# Patient Record
(demographics unavailable — no encounter records)

---

## 2024-12-03 NOTE — CARDIOLOGY SUMMARY
[de-identified] : January 17, 2023 atrial fibrillation with slow ventricular response abnormal ST-T appearance 5.16.23 af moderate rate, nstwc March 19, 2024 AF moderate rate abnormal ST appearance unchanged July 23, 2024 AF abnormal ST-T changes similar to prior [de-identified] : 2023  af variable rates wct [de-identified] : oct 14, 2021 normal nuclear stress [de-identified] : January 2022 mild  AS normal LV systolic function March 2024 moderate AS normal LV systolic function atrial enlargement [de-identified] : 2010; 2016 multivessel [de-identified] : cabg 2016

## 2024-12-03 NOTE — REVIEW OF SYSTEMS
[Feeling Fatigued] : not feeling fatigued [Weight Loss (___ Lbs)] : [unfilled] ~Ulb weight loss [Cough] : cough [Negative] : Cardiovascular

## 2024-12-03 NOTE — HISTORY OF PRESENT ILLNESS
[FreeTextEntry1] : History of ASHD CABG atrial fibrillation MAIK polycythemia.  No angina usual degree of dyspnea no edema or palpitations.    Wife  recently.  Remains active, goes to gym.  Has some faitigue, no dizziness.

## 2024-12-03 NOTE — REASON FOR VISIT
[Arrhythmia/ECG Abnorrmalities] : arrhythmia/ECG abnormalities [Structural Heart and Valve Disease] : structural heart and valve disease [Hypertension] : hypertension [Coronary Artery Disease] : coronary artery disease [FreeTextEntry3] : Toni

## 2024-12-03 NOTE — ASSESSMENT
[FreeTextEntry1] : 83year-old man  status post CABG multivessel disease.    BP well controlled.    Chronic atrial fibrillation.     Mitral regurgitation as well as aortic valve disease, moderate AS by last echo  MAIK on cpap  Fatigue.

## 2024-12-03 NOTE — DISCUSSION/SUMMARY
[Patient] : the patient [Risks] : risks [Benefits] : benefits [Alternatives] : alternatives [___ Month(s)] : in [unfilled] month(s) [FreeTextEntry1] : Discussed with patient.    Continue current cardiac medication  including Eliquis amlodipine atorvastatin aspirin losartan metoprolol oral a/c.    Follow-up in 4 months.    Labs reviewed and discussed with him.  Discussed his aortic valve disease and natural history  Cardinal symptoms of aortic stenosis reviewed and discussed, and discussed possible future evaluation including cardiac catheterization and possible TAVR or SAVR.  Cardiac monitor hook up today, call for results in 2-3 weeks.  Obtain labs from PMD.  Patient's questions were addressed to their satisfaction.

## 2024-12-03 NOTE — PHYSICAL EXAM
[Well Developed] : well developed [Well Nourished] : well nourished [No Acute Distress] : no acute distress [Normal Conjunctiva] : normal conjunctiva [Normal Venous Pressure] : normal venous pressure [No Carotid Bruit] : no carotid bruit [No Rub] : no rub [No Gallop] : no gallop [Clear Lung Fields] : clear lung fields [Good Air Entry] : good air entry [No Respiratory Distress] : no respiratory distress  [Soft] : abdomen soft [Non Tender] : non-tender [No Masses/organomegaly] : no masses/organomegaly [Normal Bowel Sounds] : normal bowel sounds [Normal Gait] : normal gait [No Cyanosis] : no cyanosis [No Clubbing] : no clubbing [No Varicosities] : no varicosities [Edema ___] : edema [unfilled] [No Rash] : no rash [No Skin Lesions] : no skin lesions [Moves all extremities] : moves all extremities [No Focal Deficits] : no focal deficits [Normal Speech] : normal speech [Alert and Oriented] : alert and oriented [Normal memory] : normal memory [de-identified] : Grade 4/6 systolic murmur best right upper sternal border [de-identified] : irregular

## 2025-04-22 NOTE — CARDIOLOGY SUMMARY
[de-identified] : January 17, 2023 atrial fibrillation with slow ventricular response abnormal ST-T appearance 5.16.23 af moderate rate, nstwc March 19, 2024 AF moderate rate abnormal ST appearance unchanged April 2025 A-fib moderate rate  July 23, 2024 AF abnormal ST-T changes similar to prior [de-identified] : 2023  af variable rates wct [de-identified] : oct 14, 2021 normal nuclear stress [de-identified] : January 2022 mild  AS normal LV systolic function March 2024 moderate AS normal LV systolic function atrial enlargement April 2025 LVH diastolic dysfunction aortic stenosis [de-identified] : 2010; 2016 multivessel [de-identified] : cabg 2016

## 2025-04-22 NOTE — HISTORY OF PRESENT ILLNESS
[FreeTextEntry1] : History of ASHD CABG atrial fibrillation MAIK polycythemia.  No angina no edema or palpitations.  No more fatigue and shortness of breath when climbing stairs or going for long walks.  Has some faitigue, no dizziness.

## 2025-04-22 NOTE — REASON FOR VISIT
[Symptom and Test Evaluation] : symptom and test evaluation [Arrhythmia/ECG Abnorrmalities] : arrhythmia/ECG abnormalities [Structural Heart and Valve Disease] : structural heart and valve disease [Hypertension] : hypertension [Coronary Artery Disease] : coronary artery disease [FreeTextEntry3] : Toni

## 2025-04-22 NOTE — PHYSICAL EXAM
[Well Developed] : well developed [Well Nourished] : well nourished [No Acute Distress] : no acute distress [Normal Conjunctiva] : normal conjunctiva [Normal Venous Pressure] : normal venous pressure [No Carotid Bruit] : no carotid bruit [No Rub] : no rub [No Gallop] : no gallop [Clear Lung Fields] : clear lung fields [Good Air Entry] : good air entry [No Respiratory Distress] : no respiratory distress  [Soft] : abdomen soft [Non Tender] : non-tender [No Masses/organomegaly] : no masses/organomegaly [Normal Bowel Sounds] : normal bowel sounds [Normal Gait] : normal gait [No Cyanosis] : no cyanosis [No Clubbing] : no clubbing [No Varicosities] : no varicosities [Edema ___] : edema [unfilled] [No Rash] : no rash [No Skin Lesions] : no skin lesions [Moves all extremities] : moves all extremities [No Focal Deficits] : no focal deficits [Normal Speech] : normal speech [Alert and Oriented] : alert and oriented [Normal memory] : normal memory [de-identified] : Grade 4/6 systolic murmur best right upper sternal border [de-identified] : irregular

## 2025-04-22 NOTE — ASSESSMENT
[FreeTextEntry1] : 83year-old man  status post CABG multivessel disease.  Diastolic dysfunction, LVH.  Aortic stenosis.   BP well controlled.    Chronic atrial fibrillation.       MAIK on cpap

## 2025-04-22 NOTE — DISCUSSION/SUMMARY
[Patient] : the patient [Risks] : risks [Benefits] : benefits [Alternatives] : alternatives [___ Month(s)] : in [unfilled] month(s) [FreeTextEntry1] : Discussed with patient.  Discussed aortic stenosis, have referred to Dr. Martinez to evaluate for possible TAVR   Continue current cardiac medication  including Eliquis amlodipine atorvastatin aspirin losartan metoprolol oral a/c.    Follow-up in 4 months.    Labs reviewed and discussed with him.  Discussed his aortic valve disease and natural history  Patient's questions were addressed to their satisfaction.      [EKG obtained to assist in diagnosis and management of assessed problem(s)] : EKG obtained to assist in diagnosis and management of assessed problem(s)

## 2025-04-30 NOTE — HISTORY OF PRESENT ILLNESS
[FreeTextEntry1] : Mr. Jacobo is an 83 year old male who is being referred to our clinic by Dr. Barnhart for evaluation of his Aortic Stenosis. He has a past medical history of MAIK, Hypertension, Hyperlipidemia, Chronic AFib, CABG in 2016 with Dr. Kearns, and Thrombocytopenia. He is a former smoker and is a social user of ETOH.  Today he presents  He had a Transthoracic Echocardiogram which was evaluated with Dr. Natty Mccormack, and demonstrates    NYHA Classification: STS 30 day Mortality Risk Score: 4.06% [Chronic Atrial Fibrillation] : chronic atrial fibrillation [Asymptomatic] : Associated Symptoms: the patient is currently asymptomatic

## 2025-04-30 NOTE — CARDIOLOGY SUMMARY
[de-identified] : 4/22/25: AFib QRS 86, QTc 406 msec [de-identified] : 4/17/25: CONCLUSIONS:  1. Technically difficult image quality. 2. Left ventricular cavity is normal in size. Left ventricular wall thickness is mildly increased. Left ventricular systolic function is normal with an ejection fraction of 57 % by Cheney's method of disks. 3. There is moderate (grade 2) left ventricular diastolic dysfunction. 4. Moderate left ventricular hypertrophy. 5. There is increased LV mass and concentric hypertrophy. 6. Left atrium is severely dilated. 7. Structurally normal mitral valve with normal leaflet excursion. 8. There is severe posterior calcification of the mitral valve annulus. 9. Moderate mitral regurgitation. 10. The right ventricle is not well visualized. probably normal right ventricular systolic function. Tricuspid annular plane systolic excursion (TAPSE) is 1.7 cm (normal >=1.7 cm). Tricuspid annular tissue Doppler S' is 0.1 cm/s (normal >10 cm/s). 11. The right atrium is severely dilated. 12. Structurally normal tricuspid valve with normal leaflet excursion. Mild to moderate tricuspid regurgitation. 13. Estimated pulmonary artery systolic pressure is 43 mmHg, consistent with mild pulmonary hypertension. 14. Pulmonic valve was not well visualized. 15. Aortic root at the sinuses of Valsalva is normal in size, measuring 3.50 cm (indexed 1.54 cm/m). Ascending aorta is normal in size, measuring 3.90 cm (indexed 1.71 cm/m). Aortic arch diameter is normal in size, measuring 2.6 cm (indexed 1.12 cm/m). 16. Trileaflet aortic valve with reduced systolic excursion. There is calcification of the aortic valve leaflets. Moderate to severe aortic stenosis. 17. Trace aortic regurgitation. 18. The inferior vena cava is normal in size measuring 0.99 cm in diameter, (normal <2.1cm) with normal inspiratory collapse (normal >50%) consistent with normal right atrial pressure ( R 3, range 0-5mmHg). 19. The interatrial septum appears intact. 20. No pericardial effusion seen. 21. The peak transaortic velocity is 2.98 m/s, peak transaortic gradient is 35.6 mmHg and mean transaortic gradient is 23.1 mmHg with an LVOT/aortic valve VTI ratio of 0.22. The effective orifice area is estimated at 0.84 cm by the continuity equation and indexed at 0.37 cm/m.

## 2025-04-30 NOTE — REASON FOR VISIT
[Structural Heart and Valve Disease] : structural heart and valve disease [FreeTextEntry3] : Dr. Barnhart

## 2025-05-06 NOTE — CARDIOLOGY SUMMARY
[de-identified] : 4/22/25: AFib QRS 86, QTc 406 msec [de-identified] : 4/17/25: CONCLUSIONS:  1. Technically difficult image quality. 2. Left ventricular cavity is normal in size. Left ventricular wall thickness is mildly increased. Left ventricular systolic function is normal with an ejection fraction of 57 % by Cheney's method of disks. 3. There is moderate (grade 2) left ventricular diastolic dysfunction. 4. Moderate left ventricular hypertrophy. 5. There is increased LV mass and concentric hypertrophy. 6. Left atrium is severely dilated. 7. Structurally normal mitral valve with normal leaflet excursion. 8. There is severe posterior calcification of the mitral valve annulus. 9. Moderate mitral regurgitation. 10. The right ventricle is not well visualized. probably normal right ventricular systolic function. Tricuspid annular plane systolic excursion (TAPSE) is 1.7 cm (normal >=1.7 cm). Tricuspid annular tissue Doppler S' is 0.1 cm/s (normal >10 cm/s). 11. The right atrium is severely dilated. 12. Structurally normal tricuspid valve with normal leaflet excursion. Mild to moderate tricuspid regurgitation. 13. Estimated pulmonary artery systolic pressure is 43 mmHg, consistent with mild pulmonary hypertension. 14. Pulmonic valve was not well visualized. 15. Aortic root at the sinuses of Valsalva is normal in size, measuring 3.50 cm (indexed 1.54 cm/m). Ascending aorta is normal in size, measuring 3.90 cm (indexed 1.71 cm/m). Aortic arch diameter is normal in size, measuring 2.6 cm (indexed 1.12 cm/m). 16. Trileaflet aortic valve with reduced systolic excursion. There is calcification of the aortic valve leaflets. Moderate to severe aortic stenosis. 17. Trace aortic regurgitation. 18. The inferior vena cava is normal in size measuring 0.99 cm in diameter, (normal <2.1cm) with normal inspiratory collapse (normal >50%) consistent with normal right atrial pressure ( R 3, range 0-5mmHg). 19. The interatrial septum appears intact. 20. No pericardial effusion seen. 21. The peak transaortic velocity is 2.98 m/s, peak transaortic gradient is 35.6 mmHg and mean transaortic gradient is 23.1 mmHg with an LVOT/aortic valve VTI ratio of 0.22. The effective orifice area is estimated at 0.84 cm by the continuity equation and indexed at 0.37 cm/m.

## 2025-05-06 NOTE — REVIEW OF SYSTEMS
[Headache] : no headache [Chills] : no chills [Chest Discomfort] : no chest discomfort [Palpitations] : no palpitations [Abdominal Pain] : no abdominal pain [Nausea] : no nausea [Vomiting] : no vomiting [Heartburn] : no heartburn [Diarrhea] : diarrhea [Constipation] : no constipation [Blood in stool] : no blood in stoo

## 2025-05-06 NOTE — REVIEW OF SYSTEMS
[Feeling Fatigued] : feeling fatigued [Dyspnea on exertion] : dyspnea during exertion [Lower Ext Edema] : lower extremity edema [Negative] : Heme/Lymph [Headache] : no headache [Chills] : no chills [Chest Discomfort] : no chest discomfort [Palpitations] : no palpitations [Abdominal Pain] : no abdominal pain [Nausea] : no nausea [Vomiting] : no vomiting [Heartburn] : no heartburn [Diarrhea] : diarrhea [Constipation] : no constipation [Blood in stool] : no blood in stoo

## 2025-05-06 NOTE — CARDIOLOGY SUMMARY
[de-identified] : 4/22/25: AFib QRS 86, QTc 406 msec [de-identified] : 4/17/25: CONCLUSIONS:  1. Technically difficult image quality. 2. Left ventricular cavity is normal in size. Left ventricular wall thickness is mildly increased. Left ventricular systolic function is normal with an ejection fraction of 57 % by Cheney's method of disks. 3. There is moderate (grade 2) left ventricular diastolic dysfunction. 4. Moderate left ventricular hypertrophy. 5. There is increased LV mass and concentric hypertrophy. 6. Left atrium is severely dilated. 7. Structurally normal mitral valve with normal leaflet excursion. 8. There is severe posterior calcification of the mitral valve annulus. 9. Moderate mitral regurgitation. 10. The right ventricle is not well visualized. probably normal right ventricular systolic function. Tricuspid annular plane systolic excursion (TAPSE) is 1.7 cm (normal >=1.7 cm). Tricuspid annular tissue Doppler S' is 0.1 cm/s (normal >10 cm/s). 11. The right atrium is severely dilated. 12. Structurally normal tricuspid valve with normal leaflet excursion. Mild to moderate tricuspid regurgitation. 13. Estimated pulmonary artery systolic pressure is 43 mmHg, consistent with mild pulmonary hypertension. 14. Pulmonic valve was not well visualized. 15. Aortic root at the sinuses of Valsalva is normal in size, measuring 3.50 cm (indexed 1.54 cm/m). Ascending aorta is normal in size, measuring 3.90 cm (indexed 1.71 cm/m). Aortic arch diameter is normal in size, measuring 2.6 cm (indexed 1.12 cm/m). 16. Trileaflet aortic valve with reduced systolic excursion. There is calcification of the aortic valve leaflets. Moderate to severe aortic stenosis. 17. Trace aortic regurgitation. 18. The inferior vena cava is normal in size measuring 0.99 cm in diameter, (normal <2.1cm) with normal inspiratory collapse (normal >50%) consistent with normal right atrial pressure ( R 3, range 0-5mmHg). 19. The interatrial septum appears intact. 20. No pericardial effusion seen. 21. The peak transaortic velocity is 2.98 m/s, peak transaortic gradient is 35.6 mmHg and mean transaortic gradient is 23.1 mmHg with an LVOT/aortic valve VTI ratio of 0.22. The effective orifice area is estimated at 0.84 cm by the continuity equation and indexed at 0.37 cm/m.

## 2025-05-06 NOTE — PHYSICAL EXAM
[Well Developed] : well developed [No Acute Distress] : no acute distress [Normal S1, S2] : normal S1, S2 [No Rub] : no rub [Murmur] : murmur [Clear Lung Fields] : clear lung fields [Good Air Entry] : good air entry [No Respiratory Distress] : no respiratory distress  [Soft] : abdomen soft [Normal Bowel Sounds] : normal bowel sounds [Edema ___] : edema [unfilled] [Normal] : no rash, no skin lesions [No Focal Deficits] : no focal deficits [Normal Speech] : normal speech [Alert and Oriented] : alert and oriented [Normal memory] : normal memory [de-identified] : III/VI LEÓN LUSB II/VI LEÓN RUSB/RLSB

## 2025-05-06 NOTE — HISTORY OF PRESENT ILLNESS
[Chronic Atrial Fibrillation] : chronic atrial fibrillation [Asymptomatic] : Associated Symptoms: the patient is currently asymptomatic [FreeTextEntry1] : Mr. Jacobo is an 83-year-old male who is being referred to our clinic by Dr. Barnhart for evaluation of his Aortic Stenosis. He has a past medical history of MAIK, Hypertension, Hyperlipidemia, Chronic AFib, CABG in 2016 with Dr. Kearns, and Thrombocytopenia. He is a former smoker and is a social user of ETOH.  Today he presents feeling OK. He reports that when walking up a stairs or a hill he gets winded, but he is ok walking on flat surface. He reports feeling fatigued and he gets pedal edema, but denies CP or dizziness.  He has had no cardiac hospitalizations. He lives independently at home. He sleeps with 2-3 pillows at night and uses CPAP machine for the past 2 years.  He had a Transthoracic Echocardiogram which was evaluated with Dr. Natty Mccormack with the following impressions: calcified aortic valve with unclear anatomy.  Calculates to severe, low flow/low gradient in the setting of Normal left ventricular function.  PATRICIA= 0.77cm2, DVI= 0.23.  Peak/mean gradients= 36/23mmHg, peak velocity= 2.98m/s. (Difficult imaging)  MAC with at least moderate (2-3+) mitral regurgitation. Severe biatrial enlargement  Mild pulmonary hypertension Normal biventricular function.  He notes that Dr Barnhart has been following his AS for several years, and his referral today was prompted by "a buildup of calcium around the valve" as well as progression of fatigue and TOBAR with climbing stairs or walking an incline. He notes "I don't have any problem with a flat surface" and uses a treadmill at the gym (without incline) on occasion. He had a CABG with Dr Kearns approximately 9 years ago. He has no angina or syncopal symptoms. His appetite is "too decent" and BR habits are normal. There have been no recent meidcation chagnes. He looks well. He plans on visiting his son in [place] later this month.   NYHA Classification: III STS 30-day Mortality Risk Score: 4.06%

## 2025-05-06 NOTE — DISCUSSION/SUMMARY
[FreeTextEntry1] : BAKER: Symptomatic severe AS, remote stents and prior CABG in 2016, TOBAR with edema. Labs today. Consider adding a diuretic--he would like to discuss this with Dr Barnhart. Schedule a cardiac CT and coronary angiogram with graft assessment. He does not want a TAVR date due to some upcoming travel. He will call after testing is completed to discuss his results and schedule TAVR at that time if we agree to proceed.

## 2025-05-06 NOTE — ASSESSMENT
[FreeTextEntry1] :  Mr. Jacobo is an 83 year old male who is being referred to our clinic by Dr. Barnhart for evaluation of his Aortic Stenosis. He has a past medical history of MAIK, Hypertension, Hyperlipidemia, Chronic AFib, CABG in 2016 with Dr. Kearns, and Thrombocytopenia. He is a former smoker and is a social user of ETOH.  Today he presents feeling OK. He reports that when walking up a stairs or a hill he gets winded, but he is ok walking on flat surface. He reports feeling fatigued and he gets pedal edema, but denies CP or dizziness.  He has had no cardiac hospitalizations. He lives independently at home. He sleeps with 2-3 pillows at night and uses CPAP machine for the past 2 years.  He had a Transthoracic Echocardiogram which was evaluated with Dr. Natty Mccormack, and demonstrates Calcified aortic valve with unclear anatomy.  Calculates to severe, low flow/low gradient in the setting of Normal left ventricular function.  PATRICIA= 0.77cm2, DVI= 0.23.  Peak/mean gradients= 36/23mmHg, peak velocity= 2.98m/s. (Difficult imaging)  MAC with at least moderate (2-3+) mitral regurgitation. Severe biatrial enlargement  Mild pulmonary hypertension Normal biventricular function.  Mr. ERNESTINE JACOBO has severe AS with PG mmHg of 36 and MG mmHg of 23 and an PATRICIA of 0.77 cm2.  He is a (intermediate/high) risk for surgical AVR with a calculated STS score of 4.06 %.   The risks and benefits of both SAVR and TAVR have been explained and the patient would like to proceed with further workup and consideration for TAVR by the structural heart team. I explained the risks of vascular complication, pacemaker requirement and possible paravalvular leakage. She will require CT scan and catheterization before finalizing plans for the procedure. The patient was also made aware of the possibility of using conscious sedation or general anesthesia during the procedure. Once completed, all imaging will be reviewed by the Heart Team and an optimal treatment strategy will be recommended.      Plan: 1) Schedule for TAVR  2) Structural CT scan without coronaries 3) Cardiac Catheterization to evaluate coronary anatomy

## 2025-05-06 NOTE — HISTORY OF PRESENT ILLNESS
[FreeTextEntry1] : Mr. Jacobo is an 83 year old male who is being referred to our clinic by Dr. Barnhart for evaluation of his Aortic Stenosis. He has a past medical history of MAIK, Hypertension, Hyperlipidemia, Chronic AFib, CABG in 2016 with Dr. Kearns, and Thrombocytopenia. He is a former smoker and is a social user of ETOH.  Today he presents feeling OK. He reports that when walking up a stairs or a hill he gets winded, but he is ok walking on flat surface. He reports feeling fatigued and he gets pedal edema, but denies CP or dizziness.  He has had no cardiac hospitalizations. He lives independently at home. He sleeps with 2-3 pillows at night and uses CPAP machine for the past 2 years.  He had a Transthoracic Echocardiogram which was evaluated with Dr. Natty Mccormack, and demonstrates Calcified aortic valve with unclear anatomy.  Calculates to severe, low flow/low gradient in the setting of Normal left ventricular function.  PATRICIA= 0.77cm2, DVI= 0.23.  Peak/mean gradients= 36/23mmHg, peak velocity= 2.98m/s. (Difficult imaging)  MAC with at least moderate (2-3+) mitral regurgitation. Severe biatrial enlargement  Mild pulmonary hypertension Normal biventricular function.  NYHA Classification: III STS 30 day Mortality Risk Score: 4.06%

## 2025-07-11 NOTE — PHYSICAL EXAM
[] : no respiratory distress [Exaggerated Use Of Accessory Muscles For Inspiration] : no accessory muscle use [FreeTextEntry1] : B/l groin JERRY, clean, dry and intact. No erythema, hematoma or drainage noted. B/L groin echymosis noted. skin tear noted on the left side of pt. back [Oriented To Time, Place, And Person] : oriented to person, place, and time

## 2025-07-11 NOTE — ASSESSMENT
[FreeTextEntry1] : Pt recovering well at home s/p TAVR. Reviewed all medications and dosages with pt understanding. Pt has all medications in home and is taking as prescribed. Pain controlled with current medication regimen. Pt. has a skin tear on the left side of his back. Advised pt. to apply OTC bacitracin BID. Pt. verbalized understanding.  Pt. was seen by [facility] RN in the home today.

## 2025-07-11 NOTE — HISTORY OF PRESENT ILLNESS
[Home] : at home, [unfilled] , at the time of the visit. [Other Location: e.g. Home (Enter Location, City,State)___] : at [unfilled] [Telehealth (audio & video)] : This visit was provided via telehealth using real-time 2-way audio visual technology. [FreeTextEntry1] : 82 yo M with PMhx CABG 2016,CAD with stents CABG 2016,  HLD, Chronic afib on  Eliquis HTN, thrombocytopenia( follow hematology, MAIK on CPAP , former smoker .  Pt has been experiencing TOBAR when climbing stairs or walking on an incline. He  reports fatigue and pedal edema. He denies any CP or dizziness.  He was seen by  cardiology  Dr Barnhart all cardiac work up -  found to have severe AS on ECHO   s/p cardiac cath .  Presents to PST for scheduled TAVR on 7/9/25.  7/9Underwent percutaneous transcatheter aortic valve replacement (TAVR) by  femoral artery approach recivere dinPACU somebradycarditimmy  TVP maintained  transfer to CTU  7/10  VSS rhythm recovered afib 59-80TVP d.c  tolerated well -ECHO completed  transfer to [address] [place] [address] discharge home with VITAL Patch  metoprolol 25 today and  for discharge  will address  [place] at f/u appointment 7/15 7/11 Initial visit completed via [facility]  CC " I am doing ok"

## 2025-07-11 NOTE — PLAN
[TextEntry] : Post Operative Care:  Pt advised of importance of daily weights. Pt instructed on how to use incentive spirometer every hour, demonstrated proper use. Pt encouraged to ambulate as much as tolerated, avoiding extreme temperatures outdoors. Also advised to cleanse incisions daily with mild soap and water and to avoid lotions, powders, ointments or creams near or on the incision. Low salt, low fat diet encouraged and discussed. Pt advised to avoid heavy lifting or straining.     Follow Your Heart team will continue to follow up with pt status.  NP/CCC roles explained with pt understanding, contact information provided. Pt agrees to call with any questions, issues or concerns.  Worsening symptoms reviewed with patient understanding.      FOLLOW UP APPOINTMENTS:  CTS:  7/15  CARDIOLOGIST:  pt. will call to schedule appointment to be seen within 2 weeks  PCP: Pt encouraged to follow up within one month of discharg

## 2025-07-14 NOTE — ADDENDUM
[FreeTextEntry1] : He went for a second walk last night and felt that he "over did it" and developed some tightness in his chest (which lasted about an hour) and "I was huffing and puffing" for which he called our service. He was advised to go to the ED but the symptoms resolved without intervention and he decided not to come in. He instead came to our office this morning. I suggested a TTE, ECG, and labs. The ECG is unchanged from prior and without any acute changes. The TTE demonstrated no pericardial effusion and, preliminarily, no new WMA. Labs are pending.  Addendum: Labs reveal an AST of 71 (it was normal on POD 1) and a CKMB of 49. While it is not uncommon to see enzyme elevations post TAVR, we don't have a trend, and he had this episode 24 hours ago. I called him with the labs and advised that he come to our ED for further evaluation and serial enzymes. Of note, he has a prior CABG, all 3 native vessels are chronically occluded, and his 3 bypass grafts were all patent in May on his pre-TAVR angiogram.

## 2025-07-14 NOTE — ASSESSMENT
[FreeTextEntry1] : 82 yo M with PMhx CABG 2016,CAD with stents CABG 2016,  HLD, Chronic afib on Eliquis HTN, rombocytopenia( follow hematology, MAIK on CPAP , former smoker. Pt has been experiencing TOBAR when climbing stairs or walking on an incline. He reports fatigue and pedal edema. He denies any CP or dizziness.  He was seen by cardiology  Dr Barnhart all cardiac work up -  found to have severe AS on ECHO s/p cardiac cath .    7/9Underwent percutaneous transcatheter aortic valve replacement (TAVR) by femoral artery approach rTVP maintained transfer to CTU. VSS rhythm recovered afib 59-80TVP, discharge home with VITAL Patch metoprolol 25.  Presents today after he called service line yesterday due to SOB and chest tightness with walking.  He was instructed to go to the ER however he declined and came in today.  He presents today and reports he started walking several times a day since DC Thursday and was increasing.  Last evening around 8PM he went out and walked his usual however became SOB and reports had chest tightness.  He called his sister who drove him home and he reports the SOB and tightness subsided.  Today he denies CP or SOB and reports overall he feels an improvement sp TAVR.        Dr Muñoz PCP getting fibroscan later in July

## 2025-07-29 NOTE — DISCUSSION/SUMMARY
[Patient] : the patient [Risks] : risks [Benefits] : benefits [Alternatives] : alternatives [___ Month(s)] : in [unfilled] month(s) [FreeTextEntry1] :  Reviewed in detail various testing and hospital course as well as various findings and medications questions addressed.  Will maintain his current regimen at least for now he is reluctant to take Pepcid medical follow-up with Dr. Muñoz follow-up with me in a few weeks we will consider for repeat echo to reassess LV function as well as his TAVR.  SBE prophylaxis discussed.  Cardiac rehab recommended though patient declines.     [EKG obtained to assist in diagnosis and management of assessed problem(s)] : EKG obtained to assist in diagnosis and management of assessed problem(s)

## 2025-07-29 NOTE — PHYSICAL EXAM
[Well Developed] : well developed [Well Nourished] : well nourished [No Acute Distress] : no acute distress [Normal Conjunctiva] : normal conjunctiva [Normal Venous Pressure] : normal venous pressure [No Carotid Bruit] : no carotid bruit [No Rub] : no rub [No Gallop] : no gallop [Clear Lung Fields] : clear lung fields [Good Air Entry] : good air entry [No Respiratory Distress] : no respiratory distress  [Soft] : abdomen soft [Non Tender] : non-tender [No Masses/organomegaly] : no masses/organomegaly [Normal Bowel Sounds] : normal bowel sounds [Normal Gait] : normal gait [No Cyanosis] : no cyanosis [No Clubbing] : no clubbing [No Varicosities] : no varicosities [Edema ___] : edema [unfilled] [No Rash] : no rash [No Skin Lesions] : no skin lesions [Moves all extremities] : moves all extremities [No Focal Deficits] : no focal deficits [Normal Speech] : normal speech [Alert and Oriented] : alert and oriented [Normal memory] : normal memory [de-identified] : irregular

## 2025-07-29 NOTE — CARDIOLOGY SUMMARY
[de-identified] : January 17, 2023 atrial fibrillation with slow ventricular response abnormal ST-T appearance 5.16.23 af moderate rate, nstwc March 19, 2024 AF moderate rate abnormal ST appearance unchanged April 2025 A-fib moderate rate  July 23, 2024 AF abnormal ST-T changes similar to prior July 29, 2025 atrial fibrillation with mild to slow ventricular response no significant ST-T changes [de-identified] : 2023  af variable rates wct [de-identified] : oct 14, 2021 normal nuclear stress [de-identified] : January 2022 mild  AS normal LV systolic function March 2024 moderate AS normal LV systolic function atrial enlargement April 2025 LVH diastolic dysfunction aortic stenosis [de-identified] : 2010; 2016 multivessel 2025 patent grafts [de-identified] : cabg 2016 TAVR July 2025

## 2025-07-29 NOTE — REASON FOR VISIT
[Arrhythmia/ECG Abnorrmalities] : arrhythmia/ECG abnormalities [Structural Heart and Valve Disease] : structural heart and valve disease [Hypertension] : hypertension [Coronary Artery Disease] : coronary artery disease [Other: ____] : [unfilled] [FreeTextEntry3] : Toni

## 2025-07-29 NOTE — HISTORY OF PRESENT ILLNESS
[FreeTextEntry1] : History of ASHD CABG atrial fibrillation MAIK polycythemia.  He went through TAVR evaluation including cardiac cath with patent grafts.  He underwent TAVR but returned to the hospital few days later he had exertional chest pain while walking and elevated enzymes.  He was felt to have non-STEMI and nuclear stress test showed medium sized severe defect in the inferior wall partially reversible suggestive of infarction with moderate jean-infarct ischemia similar findings in the lateral wall LV function mildly reduced although EF calculated 61%.  He was treated medically and discharged.  Case was discussed with Dr. Martinez and Dr. Ng.  Possible embolization causing infarction.  He is at home list is updated.  He has been doing some walking he has no angina or significant dyspnea no significant edema.

## 2025-07-29 NOTE — ASSESSMENT
[FreeTextEntry1] : 83year-old man  status post CABG multivessel disease.  Diastolic dysfunction, LVH.  Aortic stenosis.       Chronic atrial fibrillation.       MAIK on cpap  Patient now status post TAVR subsequent readmission for apparent non-STEMI possibly embolic.  Patient asymptomatic at this time